# Patient Record
Sex: MALE | Race: BLACK OR AFRICAN AMERICAN
[De-identification: names, ages, dates, MRNs, and addresses within clinical notes are randomized per-mention and may not be internally consistent; named-entity substitution may affect disease eponyms.]

---

## 2018-07-20 ENCOUNTER — HOSPITAL ENCOUNTER (EMERGENCY)
Dept: HOSPITAL 62 - ER | Age: 23
Discharge: HOME | End: 2018-07-20
Payer: OTHER GOVERNMENT

## 2018-07-20 VITALS — DIASTOLIC BLOOD PRESSURE: 63 MMHG | SYSTOLIC BLOOD PRESSURE: 101 MMHG

## 2018-07-20 DIAGNOSIS — G89.29: Primary | ICD-10-CM

## 2018-07-20 DIAGNOSIS — M54.5: ICD-10-CM

## 2018-07-20 DIAGNOSIS — Z79.899: ICD-10-CM

## 2018-07-20 PROCEDURE — 99283 EMERGENCY DEPT VISIT LOW MDM: CPT

## 2018-07-20 PROCEDURE — 96372 THER/PROPH/DIAG INJ SC/IM: CPT

## 2018-07-20 NOTE — ER DOCUMENT REPORT
ED General





- General


Chief Complaint: Back Pain


Stated Complaint: BACK PAIN


Time Seen by Provider: 07/20/18 21:05


Notes: 





Patient is a 23-year-old male with a past medical history of chronic back pain 

who presents with 3 months of exacerbation of his chronic low back pain.  The 

patient states ever since he was injured while in the  he has had 

chronic issues with his low back.  He states that he has [facility] 

[facility] that they have been treating with muscle 

relaxants but he has not had any relief.  He describes a daily, constant, severe

, throbbing pain to his bilateral low back.  Movement worsens the pain.  He is 

not in any form of physical occupational therapy.  He denies any bowel or 

bladder incontinence, urinary retention, inability to ambulate, focal weakness 

or numbness, IV drug use or fever.  No new trauma or injury to the area.  

Nothing is new or different about her symptoms today that prompted a visit to 

the emergency department.





- Related Data


Allergies/Adverse Reactions: 


 





No Known Allergies Allergy (Unverified 07/20/18 19:49)


 











Past Medical History





- General


Information source: Patient





- Social History


Smoking Status: Never Smoker


Frequency of alcohol use: None


Drug Abuse: None


Lives with: Spouse/Significant other


Family History: Reviewed & Not Pertinent


Patient has suicidal ideation: No


Patient has homicidal ideation: No


Renal/ Medical History: Denies: Hx Peritoneal Dialysis





Review of Systems





- Review of Systems


Notes: 





Constitutional: Negative for fever.


HENT: Negative for sore throat.


Eyes: Negative for visual changes.


Cardiovascular: Negative for chest pain.


Respiratory: Negative for shortness of breath.


Gastrointestinal: Negative for abdominal pain, vomiting or diarrhea.


Genitourinary: Negative for dysuria.


Musculoskeletal: Positive for chronic low back pain


Skin: Negative for rash.


Neurological: Negative for headaches, weakness or numbness.





10 point ROS negative except as marked above and in HPI.





Physical Exam





- Vital signs


Vitals: 


 











Temp Pulse Resp BP Pulse Ox


 


 98.0 F   73   18   121/68   98 


 


 07/20/18 19:54  07/20/18 19:54  07/20/18 19:54  07/20/18 19:54  07/20/18 19:54











Interpretation: Normal


Notes: 





PHYSICAL EXAMINATION:





GENERAL: Well-appearing, well-nourished and in no acute distress.





HEAD: Atraumatic, normocephalic.





EYES: Pupils equal round and reactive to light, extraocular movements intact, 

sclera anicteric, conjunctiva are normal.





ENT: nares patent, oropharynx clear without exudates.  Moist mucous membranes.





NECK: Normal range of motion, supple without lymphadenopathy





LUNGS: Breath sounds clear to auscultation bilaterally and equal.  No wheezes 

rales or rhonchi.





HEART: Regular rate and rhythm without murmurs





ABDOMEN: Soft, nontender, normoactive bowel sounds.  No guarding, no rebound.  

No masses appreciated.





EXTREMITIES: Normal range of motion, no pitting or edema.  No cyanosis.





Back: No midline spinal tenderness, step-off or deformity





NEUROLOGICAL: 5 out of 5 strength both distally and proximally bilateral lower 

extremities.  2+ patellar reflexes bilaterally.  No clonus.  Sensation grossly 

intact in the bilateral lower extremities.  Patient is able to ambulate without 

difficulty.





PSYCH: Normal mood, normal affect.





SKIN: Warm, Dry, normal turgor, no rashes or lesions noted.





Course





- Re-evaluation


Re-evalutation: 





07/20/18 22:17


Presentation of a well appearing patient complaining of acute on chronic back 

pain. No rapid progression of symptoms, systemic symptoms including fevers, 

chills, weight loss, history of recent bacterial infection, bilateral symptoms, 

numbness, weakness, difficulty walking, urinary retention or bowel incontinence

, personal history of cancer, immunosuppression, diabetes, known AAA, or 

history of IV drug use.  Exam is without point tenderness over vertebral bodies

, pulsatile abdominal mass, and patient has symmetric and intact lower 

extremity strength, sensation, and reflexes without clonus. 2+ symmetric medial 

malleolar and dorsalis pedis pulses








Based on history and physical, I have a very low suspicion of a concerning 

etiology of pain including epidural compression syndrome, spinal infection, 

transverse myelitis, malignancy, abdominal aortic aneurysm, renal colic, acute 

lower extremity claudication, neurogenic claudication, ankylosing spondylitis, 

or other intra-abdominal process. Due to absence of concerning risk factors in 

history and physical as well as absence of rapidly progressive, severe, or 

bilateral symptoms, will defer imaging at this point.  





Plan to manage conservatively with outpatient analgesia, analgesia, and 

physical therapy.  


- Acetaminophen 650 q 4 + ibuprofen 600 q 6


- Continue normal daily activities as tolerated by pain


- Provide with standard musculoskeletal back pain exercise instructions


- Instruct to follow up with primary care provider if symptoms not improving


- Provide careful return precautions and concerning symptoms to watch for.





- Vital Signs


Vital signs: 


 











Temp Pulse Resp BP Pulse Ox


 


 98 F   65   16   101/63   97 


 


 07/20/18 22:51  07/20/18 22:51  07/20/18 22:51  07/20/18 22:51  07/20/18 22:51














Discharge





- Discharge


Clinical Impression: 


 Acute exacerbation of chronic low back pain





Condition: Good


Disposition: HOME, SELF-CARE


Additional Instructions: 


You have been seen in the Emergency Department (ED)  today for back pain.  Your 

workup and exam have not shown any acute abnormalities and you are likely 

suffering from muscle strain or possible problems with your discs, but there is 

no treatment that will fix your symptoms at this time.  Please take the 

naproxen that has been prescribed as directed.  He should also take Tylenol 

1000 mg every 6 hours as needed for additional pain.  Use tramadol at night 

only as needed for pain that prevents you from sleeping.  You should also 

purchase a local lidocaine cream such as "aspercreme with lidocaine" and use 

per bottle instructions to the affected area. Apply heat to the area as often 

as you are able. Continue to keep active and avoid prolonged periods of bed 

rest.





Please follow up with your doctor as soon as possible regarding today's ED 

visit and your back pain.  Return to the ED for worsening back pain, fever, 

weakness or numbness of either leg, or if you develop either (1) an inability 

to urinate or have bowel movements, or (2) loss of your ability to control your 

bathroom functions (if you start having "accidents"), or if you develop other 

new symptoms that concern you.concern you.








As we discussed today, your primary care doctor needs to urgently refer you to 

physical therapy as prolonged periods of bedrest and immobility are worsening 

your symptoms and increase the chances of you being chronically debilitated.


Prescriptions: 


Famotidine 40 mg PO DAILY #60 tablet


Naproxen 500 mg PO BID #60 tablet


Tramadol HCl 50 mg PO Q6H PRN #10 tablet


 PRN Reason: Severe Pain


Referrals: 


REJI RIVERA MD [Primary Care Provider] - Follow up as needed